# Patient Record
Sex: MALE | Race: WHITE | Employment: OTHER | URBAN - METROPOLITAN AREA
[De-identification: names, ages, dates, MRNs, and addresses within clinical notes are randomized per-mention and may not be internally consistent; named-entity substitution may affect disease eponyms.]

---
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
## 2019-03-21 ENCOUNTER — APPOINTMENT (OUTPATIENT)
Dept: GENERAL RADIOLOGY | Facility: CLINIC | Age: 19
End: 2019-03-21
Attending: FAMILY MEDICINE
Payer: COMMERCIAL

## 2019-03-21 ENCOUNTER — HOSPITAL ENCOUNTER (EMERGENCY)
Facility: CLINIC | Age: 19
Discharge: HOME | End: 2019-03-21
Attending: FAMILY MEDICINE | Admitting: FAMILY MEDICINE
Payer: COMMERCIAL

## 2019-03-21 VITALS
SYSTOLIC BLOOD PRESSURE: 141 MMHG | RESPIRATION RATE: 16 BRPM | DIASTOLIC BLOOD PRESSURE: 89 MMHG | WEIGHT: 206 LBS | TEMPERATURE: 98.2 F | BODY MASS INDEX: 25.75 KG/M2 | OXYGEN SATURATION: 98 %

## 2019-03-21 DIAGNOSIS — M25.532 LEFT WRIST PAIN: ICD-10-CM

## 2019-03-21 DIAGNOSIS — S67.32XA: ICD-10-CM

## 2019-03-21 PROCEDURE — 99283 EMERGENCY DEPT VISIT LOW MDM: CPT | Mod: Z6 | Performed by: FAMILY MEDICINE

## 2019-03-21 PROCEDURE — 99283 EMERGENCY DEPT VISIT LOW MDM: CPT | Performed by: FAMILY MEDICINE

## 2019-03-21 PROCEDURE — 73110 X-RAY EXAM OF WRIST: CPT | Mod: TC,LT

## 2019-03-21 ASSESSMENT — ENCOUNTER SYMPTOMS
JOINT SWELLING: 0
COLOR CHANGE: 0
WOUND: 0
ARTHRALGIAS: 1

## 2019-03-21 NOTE — ED AVS SNAPSHOT
[facility] Emergency Department  [address] [place] [address]  Phone:  [phone]  Fax:  [phone]                                    Austin A Stromberg   MRN: [id number]    Department:  [facility] Emergency Department   Date of Visit:  3/21/2019           After Visit Summary Signature Page    I have received my discharge instructions, and my questions have been answered. I have discussed any challenges I see with this plan with the nurse or doctor.    ..........................................................................................................................................  Patient/Patient Representative Signature      ..........................................................................................................................................  Patient Representative Print Name and Relationship to Patient    ..................................................               ................................................  Date                                   Time    ..........................................................................................................................................  Reviewed by Signature/Title    ...................................................              ..............................................  Date                                               Time          22EPIC Rev 08/18

## 2019-03-22 NOTE — ED TRIAGE NOTES
Pt was removing a 20 pound gas tank and it dropped landing on his left hand/wrist.   FROM and pulse present.  Denies numbness or tingling.

## 2019-03-22 NOTE — DISCHARGE INSTRUCTIONS
Please read and follow the handout(s) instructions. Return, if needed, for increased or worsening symptoms and as directed by the handout(s).    You may apply ice or cold packs to the area for 10-15 minutes every 1-2 hours as needed to relieve pain and/or swelling.

## 2019-03-22 NOTE — ED PROVIDER NOTES
History     Chief Complaint   Patient presents with     Hand Injury     The history is provided by the patient.     Austin A Stromberg is a 18 year old male who presents to the emergency department for a hand injury. Patient reports he injured his left hand tonight around 1915 while working on his truck. He states he was removing a 20 pound gas tank from his truck when it fell down about 1 foot and landed on his left hand. He was able to keep working on the fuel pump for another 1/2 hour after that. He states he has pain near his left thumb, wrist and lower forearm. Patient denies any pain in the elbow or numbness or tingling. He states he did cut his left hand in 2015 with a table saw. He has not put ice on his injury yet. He refused offer of pain medication at this time.    Allergies:  Allergies   Allergen Reactions     Penicillins      Ancef [Cefazolin] Rash       Problem List:    There are no active problems to display for this patient.       Past Medical History:    History reviewed. No pertinent past medical history.    Past Surgical History:    Past Surgical History:   Procedure Laterality Date     REPAIR TENDON HAND Left        Family History:    Family History   Problem Relation Age of Onset     Depression Mother         anxiety     Hypertension Paternal Grandfather      Cancer - colorectal Maternal Grandmother         great grandmother and many of her silbings     Neurologic Disorder Other         cousin with seizures     Thyroid Disease Maternal Aunt         graaves       Social History:  Marital Status:  Single [1]  Social History     Tobacco Use     Smoking status: Passive Smoke Exposure - Never Smoker     Smokeless tobacco: Never Used     Tobacco comment: Mom tries to smoke outside.    Substance Use Topics     Alcohol use: Yes     Alcohol/week: 0.0 oz     Comment: socially     Drug use: No        Medications:      IBUPROFEN PO         Review of Systems   Musculoskeletal: Positive for arthralgias  (left wrist pain). Negative for joint swelling.        Left hand injury     Skin: Negative for color change, rash and wound.   All other systems reviewed and are negative.      Physical Exam   BP: 141/89  Heart Rate: 69  Temp: 98.2  F (36.8  C)  Resp: 16  Weight: 93.4 kg (206 lb)  SpO2: 98 %      Physical Exam   Constitutional: He appears well-developed and well-nourished. No distress.   Musculoskeletal:        Left wrist: He exhibits tenderness and bony tenderness. He exhibits normal range of motion, no swelling, no effusion, no crepitus, no deformity and no laceration.        Arms:       Left hand: He exhibits deformity (Mature scar deformity noted to the left hand. Some roation of the 5th finger with flexion. Patient states this has been present since the inury in 2015.).        Hands:  Nursing note and vitals reviewed.      ED Course        Procedures               Critical Care time:  none            Results for orders placed or performed during the hospital encounter of 03/21/19 (from the past 24 hour(s))   XR Wrist Left G/E 3 Views    Narrative    XR WRIST LEFT G/E 3 VIEWS 3/21/2019 8:04 PM     HISTORY: trauma, pain, crush injury      Impression    IMPRESSION: Negative exam.    SOILA WADE MD         Assessments & Plan (with Medical Decision Making)  Crush injury to the left wrist area. No fracture identified on x-ray. Ice and elevation with ROM instructions given. Ibuprofen for pain if needed.  Follow-up in his clinic if not improved in 7-10 days for possible repeat x-ray for continued pain to R/O occult fracture.     I have reviewed the nursing notes.    I have reviewed the findings, diagnosis, plan and need for follow up with the patient.            I verbally discussed the findings of the evaluation today in the ER. I have verbally discussed with Houston the suggested treatment(s) as described in the discharge instructions and handouts.     I have verbally suggested he follow-up in his clinic or return  to the ER for increased symptoms. See the follow-up recommendations documented  in the after visit summary in this visit's EPIC chart.    Final diagnoses:   Left wrist pain   Crush injury, wrist, left, initial encounter     This document serves as a record of services personally performed by Sanket Saavedra DO. It was created on their behalf by Yajaiar Chow, a trained medical scribe. The creation of this record is based on the provider's personal observations and the statements of the patient. This document has been checked and approved by the attending provider.    Note: Chart documentation done in part with Dragon Voice Recognition software. Although reviewed after completion, some word and grammatical errors may remain.    3/21/2019   [facility] EMERGENCY DEPARTMENT     Jonathan Saavedra DO  03/21/19 2034

## 2020-09-07 ENCOUNTER — HOSPITAL ENCOUNTER (EMERGENCY)
Facility: CLINIC | Age: 20
Discharge: HOME | End: 2020-09-07
Attending: EMERGENCY MEDICINE | Admitting: EMERGENCY MEDICINE
Payer: COMMERCIAL

## 2020-09-07 VITALS
HEART RATE: 62 BPM | TEMPERATURE: 98.7 F | SYSTOLIC BLOOD PRESSURE: 158 MMHG | DIASTOLIC BLOOD PRESSURE: 95 MMHG | OXYGEN SATURATION: 100 % | RESPIRATION RATE: 16 BRPM

## 2020-09-07 DIAGNOSIS — S61.412A LACERATION OF LEFT HAND WITHOUT FOREIGN BODY, INITIAL ENCOUNTER: ICD-10-CM

## 2020-09-07 PROCEDURE — 12002 RPR S/N/AX/GEN/TRNK2.6-7.5CM: CPT | Performed by: EMERGENCY MEDICINE

## 2020-09-07 PROCEDURE — 99283 EMERGENCY DEPT VISIT LOW MDM: CPT | Performed by: EMERGENCY MEDICINE

## 2020-09-07 PROCEDURE — 99282 EMERGENCY DEPT VISIT SF MDM: CPT | Mod: 25 | Performed by: EMERGENCY MEDICINE

## 2020-09-07 PROCEDURE — 12002 RPR S/N/AX/GEN/TRNK2.6-7.5CM: CPT | Mod: Z6 | Performed by: EMERGENCY MEDICINE

## 2020-09-07 NOTE — ED AVS SNAPSHOT
[facility] Emergency Department  [address]   [place] [address]  Phone:  [phone]  Fax:  [phone]                                    Austin A Stromberg   MRN: [id number]    Department:  [facility] Emergency Department   Date of Visit:  9/7/2020           After Visit Summary Signature Page    I have received my discharge instructions, and my questions have been answered. I have discussed any challenges I see with this plan with the nurse or doctor.    ..........................................................................................................................................  Patient/Patient Representative Signature      ..........................................................................................................................................  Patient Representative Print Name and Relationship to Patient    ..................................................               ................................................  Date                                   Time    ..........................................................................................................................................  Reviewed by Signature/Title    ...................................................              ..............................................  Date                                               Time          22EPIC Rev 08/18

## 2020-09-07 NOTE — ED TRIAGE NOTES
Pt presents with concerns of a laceration.   Pt has a laceration on the left palm of the hand.  Pt cut it on a light mount- steel.  Tetanus vaccine was 2014.  No active bleeding.   Patient's airway, breathing, circulation, and disability/mental status (ABCDs) intact/WDL during triage.

## 2020-09-08 NOTE — DISCHARGE INSTRUCTIONS
Keep wound clean and dry.  Okay to shower.  No soaking.  If you are going to be in a dirty environment, I recommend that you cover with dressing and/or gloves.    Sutures out in 7 to 10 days.    Watch for any signs of infection and follow-up in clinic or return for concerns.      I hope you heal quickly!!
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
## 2021-02-24 ENCOUNTER — HOSPITAL ENCOUNTER (EMERGENCY)
Facility: CLINIC | Age: 21
Discharge: HOME | End: 2021-02-24
Attending: PHYSICIAN ASSISTANT | Admitting: PHYSICIAN ASSISTANT
Payer: COMMERCIAL

## 2021-02-24 ENCOUNTER — APPOINTMENT (OUTPATIENT)
Dept: GENERAL RADIOLOGY | Facility: CLINIC | Age: 21
End: 2021-02-24
Attending: PHYSICIAN ASSISTANT
Payer: COMMERCIAL

## 2021-02-24 VITALS
SYSTOLIC BLOOD PRESSURE: 145 MMHG | DIASTOLIC BLOOD PRESSURE: 97 MMHG | OXYGEN SATURATION: 97 % | TEMPERATURE: 99.1 F | RESPIRATION RATE: 17 BRPM | HEART RATE: 78 BPM

## 2021-02-24 DIAGNOSIS — T15.92XA: ICD-10-CM

## 2021-02-24 PROCEDURE — 65205 REMOVE FOREIGN BODY FROM EYE: CPT | Mod: LT | Performed by: PHYSICIAN ASSISTANT

## 2021-02-24 PROCEDURE — 99283 EMERGENCY DEPT VISIT LOW MDM: CPT | Mod: 25 | Performed by: PHYSICIAN ASSISTANT

## 2021-02-24 PROCEDURE — 70200 X-RAY EXAM OF EYE SOCKETS: CPT

## 2021-02-24 RX ORDER — TETRACAINE HYDROCHLORIDE 5 MG/ML
1-2 SOLUTION OPHTHALMIC ONCE
Status: DISCONTINUED | OUTPATIENT
Start: 2021-02-24 | End: 2021-02-25 | Stop reason: HOSPADM

## 2021-02-24 ASSESSMENT — VISUAL ACUITY: OU: 1

## 2021-02-25 NOTE — ED TRIAGE NOTES
Pt reports going some metal work without glasses on a couple hours ago and a piece of metal flew at his left eye. Pt reports he flushed it out and had his mom look at it but wasn't able to find anything or make it better.

## 2021-02-25 NOTE — ED PROVIDER NOTES
History     Chief Complaint   Patient presents with     Eye Pain Left Eye     HPI  Austin A Stromberg is a 20 year old male who presents for evaluation of a possible foreign body in his left eye.  States that he was using a lathe grinding metal.  He saw the metal shaving fly up and into his left eye.  He had immediate onset of a foreign body sensation.  He denies any real pain at this point.  He denies any visual acuity abnormalities.  Does not have any abnormal sensation in his right eye.  Denies wearing contact lenses or glasses.  Was not wearing protective eyewear when he was performing this task.  The accident occurred 30 minutes prior to arrival to the ED.    Allergies:  Allergies   Allergen Reactions     Penicillins      Ancef [Cefazolin] Rash       Problem List:    There are no active problems to display for this patient.       Past Medical History:    History reviewed. No pertinent past medical history.    Past Surgical History:    Past Surgical History:   Procedure Laterality Date     REPAIR TENDON HAND Left        Family History:    Family History   Problem Relation Age of Onset     Depression Mother         anxiety     Hypertension Paternal Grandfather      Cancer - colorectal Maternal Grandmother         great grandmother and many of her silbings     Neurologic Disorder Other         cousin with seizures     Thyroid Disease Maternal Aunt         flaco       Social History:  Marital Status:  Single [1]  Social History     Tobacco Use     Smoking status: Passive Smoke Exposure - Never Smoker     Smokeless tobacco: Never Used     Tobacco comment: Mom tries to smoke outside.    Substance Use Topics     Alcohol use: Yes     Alcohol/week: 0.0 standard drinks     Comment: socially     Drug use: No        Medications:    IBUPROFEN PO          Review of Systems   All other systems reviewed and are negative.      Physical Exam   BP: (!) 155/93  Pulse: 89  Temp: 99.1  F (37.3  C)  Resp: 20  SpO2: 98  %      Physical Exam  Vitals signs and nursing note reviewed.   Constitutional:       General: He is not in acute distress.     Appearance: He is not ill-appearing, toxic-appearing or diaphoretic.   HENT:      Head: Normocephalic and atraumatic.      Left Ear: External ear normal.      Nose: Nose normal.      Mouth/Throat:      Mouth: Mucous membranes are moist.   Eyes:      General: Lids are normal. Vision grossly intact. Gaze aligned appropriately. No visual field deficit or scleral icterus.        Right eye: No foreign body, discharge or hordeolum.         Left eye: Foreign body (Small metallic foreign body in the lower eyelid that came out when we had him open and close his eyes multiple times.  It was removed with a water-soaked cotton-tipped applicator without complication.  No further foreign bodies identified.) present.No discharge or hordeolum.      Extraocular Movements: Extraocular movements intact.      Conjunctiva/sclera: Conjunctivae normal.      Pupils: Pupils are equal, round, and reactive to light.      Left eye: No corneal abrasion or fluorescein uptake.   Neurological:      Mental Status: He is alert.         ED Course        Procedures               Critical Care time:  none               Results for orders placed or performed during the hospital encounter of 02/24/21 (from the past 24 hour(s))   XR Orbits G/E 3 Views    Narrative    XR ORBITS G/E 4 VW   2/24/2021 9:51 PM     HISTORY: Possible bilateral eye metallic FB    COMPARISON: None.      Impression    IMPRESSION: There is no evidence for any radiopaque foreign bodies  over either globe.    SHANTE MEJIA MD       Medications   tetracaine (PONTOCAINE) 0.5 % ophthalmic solution 1-2 drop (1 drop Left Eye Handoff 2/24/21 2046)       Assessments & Plan (with Medical Decision Making)  Acute foreign body of left eye     20 year old male presents for evaluation of a possible metallic foreign body in his left eye that occurred just prior to arrival.   Denies any visual abnormalities or pain.  On exam he does have a small metallic foreign body that came down onto the lower eyelid with multiple opening closing of the eye trying to find a foreign body.  This was removed with a cotton tipped applicator as noted above.  We did perform an x-ray of the orbits to ensure that there was no further retained foreign body.  This was negative.  Fluorescein dye applied at the end of the exam and did not show any corneal uptake and no sign of corneal abrasion.  Gross visual acuity exam completely normal.  There is no evidence for further injury.  Indications for follow-up reviewed with the patient.  He states that his eye symptoms did actually resolve by the end of his ED visit.  Strongly encouraged him to use eye protection in the future.     I have reviewed the nursing notes.    I have reviewed the findings, diagnosis, plan and need for follow up with the patient.       Discharge Medication List as of 2/24/2021 10:13 PM          Final diagnoses:   Acute foreign body of left eye       Disclaimer: This note consists of symbols derived from keyboarding, dictation and/or voice recognition software. As a result, there may be errors in the script that have gone undetected. Please consider this when interpreting information found in this chart.      2/24/2021   [facility] EMERGENCY DEPT     Rik Crum PA-C  02/24/21 [id number]

## 2021-02-25 NOTE — ED NOTES
Pt continues with irritation of the left eye, frequent blinking and shooting pains from time to time

## 2022-01-04 ENCOUNTER — HOSPITAL ENCOUNTER (EMERGENCY)
Facility: CLINIC | Age: 22
Discharge: HOME | End: 2022-01-04
Attending: FAMILY MEDICINE | Admitting: FAMILY MEDICINE
Payer: COMMERCIAL

## 2022-01-04 VITALS
RESPIRATION RATE: 20 BRPM | SYSTOLIC BLOOD PRESSURE: 145 MMHG | TEMPERATURE: 97 F | DIASTOLIC BLOOD PRESSURE: 88 MMHG | OXYGEN SATURATION: 95 % | HEART RATE: 78 BPM | WEIGHT: 215 LBS | BODY MASS INDEX: 26.87 KG/M2

## 2022-01-04 DIAGNOSIS — Z72.0 VAPES NICOTINE CONTAINING SUBSTANCE: ICD-10-CM

## 2022-01-04 DIAGNOSIS — U07.1 COVID-19: ICD-10-CM

## 2022-01-04 LAB
FLUAV RNA SPEC QL NAA+PROBE: NEGATIVE
FLUBV RNA RESP QL NAA+PROBE: NEGATIVE
SARS-COV-2 RNA RESP QL NAA+PROBE: POSITIVE

## 2022-01-04 PROCEDURE — C9803 HOPD COVID-19 SPEC COLLECT: HCPCS | Performed by: FAMILY MEDICINE

## 2022-01-04 PROCEDURE — 87636 SARSCOV2 & INF A&B AMP PRB: CPT | Performed by: FAMILY MEDICINE

## 2022-01-04 PROCEDURE — 99284 EMERGENCY DEPT VISIT MOD MDM: CPT | Performed by: FAMILY MEDICINE

## 2022-01-04 RX ORDER — ACETAMINOPHEN 500 MG
500-1000 TABLET ORAL EVERY 6 HOURS PRN
Refills: 0 | COMMUNITY
Start: 2022-01-04 | End: 2022-01-08

## 2022-01-04 RX ORDER — MELOXICAM 15 MG/1
15 TABLET ORAL DAILY
Qty: 10 TABLET | Refills: 0 | Status: SHIPPED | OUTPATIENT
Start: 2022-01-04 | End: 2022-01-14

## 2022-01-04 RX ORDER — ONDANSETRON 4 MG/1
4 TABLET, ORALLY DISINTEGRATING ORAL EVERY 8 HOURS PRN
Qty: 10 TABLET | Refills: 0 | Status: SHIPPED | OUTPATIENT
Start: 2022-01-04 | End: 2022-01-07

## 2022-01-05 ENCOUNTER — PATIENT OUTREACH (OUTPATIENT)
Dept: CARE COORDINATION | Facility: CLINIC | Age: 22
End: 2022-01-05
Payer: COMMERCIAL

## 2022-01-05 NOTE — PROGRESS NOTES
Clinic Care Coordination Contact    Care Coordination ED Discharge Follow up Note    Patient referred for Virtual Home Monitoring Program for COVID-19 following recent ED visit.    RN has confirmed a [facility] referral is in place.    Criteria for Virtual Home Monitoring telephone outreach is not met after review of ED encounter/ED provider note because:    1) Patient did not report shortness of breath or respiratory distress as a symptom/concern.    2) ED provider note indicates assessment was negative for respiratory distress, and O2 sats as well as vital signs were stable.      3) Patient was not discharged with new supplemental oxygen.    Per notes, ED provider and/or ED team discussed appropriate follow up guidelines with patient prior to discharge, or reflected these instructions on AVS.       [facility] team remains available to support patient via [facility] account once activated by patient.     Darcy Alonso RN  [facility]  - Clinic Care Coordinator

## 2022-01-05 NOTE — ED PROVIDER NOTES
History     Chief Complaint   Patient presents with     Cough     HPI  Austin A Stromberg is a 21 year old male who presents to the ER with concerns about cough, chills, fever, muscle aches and headache symptoms have been present for 1 week.  Patient is concerned because he has had some increasing shortness of breath symptoms associated with any activity that he does not run the home.  Is also had some diarrhea issues but states that has actually gotten some better recently.  States that he is typically otherwise healthy.  When asked if he is a smoker or vapor he states that he does not vape but is cut down on how often he is vaping to just twice a day in the last few days because of his illness.      Allergies:  Allergies   Allergen Reactions     Penicillins      Ancef [Cefazolin] Rash       Problem List:    There are no problems to display for this patient.       Past Medical History:    No past medical history on file.    Past Surgical History:    Past Surgical History:   Procedure Laterality Date     REPAIR TENDON HAND Left        Family History:    Family History   Problem Relation Age of Onset     Depression Mother         anxiety     Hypertension Paternal Grandfather      Cancer - colorectal Maternal Grandmother         great grandmother and many of her silbings     Neurologic Disorder Other         cousin with seizures     Thyroid Disease Maternal Aunt         flaco       Social History:  Marital Status:  Single [1]  Social History     Tobacco Use     Smoking status: Passive Smoke Exposure - Never Smoker     Smokeless tobacco: Never Used     Tobacco comment: Mom tries to smoke outside.    Substance Use Topics     Alcohol use: Yes     Alcohol/week: 0.0 standard drinks     Comment: socially     Drug use: No        Medications:    acetaminophen (TYLENOL) 500 MG tablet  meloxicam (MOBIC) 15 MG tablet  ondansetron (ZOFRAN ODT) 4 MG ODT tab          Review of Systems   All other systems reviewed and are  negative.      Physical Exam   BP: (!) 154/100  Pulse: 100  Temp: 97  F (36.1  C)  Resp: 18  Weight: 97.5 kg (215 lb)  SpO2: 96 %      Physical Exam  Vitals and nursing note reviewed.   Constitutional:       General: He is in acute distress.      Appearance: He is normal weight. He is ill-appearing.   HENT:      Head: Normocephalic and atraumatic.      Nose: Congestion present.      Mouth/Throat:      Mouth: Mucous membranes are moist.   Eyes:      General: No scleral icterus.     Extraocular Movements: Extraocular movements intact.      Conjunctiva/sclera: Conjunctivae normal.      Pupils: Pupils are equal, round, and reactive to light.   Cardiovascular:      Rate and Rhythm: Normal rate.      Pulses: Normal pulses.   Pulmonary:      Effort: Pulmonary effort is normal. No respiratory distress.      Breath sounds: Normal breath sounds.   Abdominal:      Tenderness: There is no guarding.   Musculoskeletal:         General: No tenderness.      Cervical back: Normal range of motion and neck supple.      Right lower leg: No edema.      Left lower leg: No edema.   Skin:     Capillary Refill: Capillary refill takes less than 2 seconds.      Coloration: Skin is not pale.      Findings: No rash.   Neurological:      Mental Status: He is alert and oriented to person, place, and time.   Psychiatric:         Mood and Affect: Mood normal.         Behavior: Behavior normal.         ED Course                 Procedures              Critical Care time:  none               Results for orders placed or performed during the hospital encounter of 01/04/22 (from the past 24 hour(s))   Symptomatic; Unknown Influenza A/B & SARS-CoV2 (COVID-19) Virus PCR Multiplex Nose    Specimen: Nose; Swab   Result Value Ref Range    Influenza A PCR Negative Negative    Influenza B PCR Negative Negative    SARS CoV2 PCR Positive (A) Negative    Narrative    Testing was performed using the mariano SARS-CoV-2 & Influenza A/B Assay on the mariano [employer] System.  This test should be ordered for the detection of SARS-CoV-2 and influenza viruses in individuals who meet clinical and/or epidemiological criteria. Test performance is unknown in asymptomatic patients. This test is for in vitro diagnostic use under the FDA EUA for laboratories certified under CLIA to perform moderate and/or high complexity testing. This test has not been FDA cleared or approved. A negative result does not rule out the presence of PCR inhibitors in the specimen or target RNA in concentration below the limit of detection for the assay. If only one viral target is positive but coinfection with multiple targets is suspected, the sample should be re-tested with another FDA cleared, approved or authorized test, if coinfection would change clinical management. [facility] are certified under the Clinical Laboratory Improvement Amendments of 1988 (CLIA-88) as  qualified to perform moderate and/or high complexity laboratory testing.       Medications - No data to display    Assessments & Plan (with Medical Decision Making)  21-year-old to the ER secondary concerns of not feeling well for the last 8 days.  Patient with cough, shortness of breath, body aches, fever, chills, and diarrhea symptoms.  He states that the diarrhea has improved some.  Patient also vapes and continues to vape 1 or 2 times a day since being sick.  He is encouraged to stop that activity to improve his oxygenation to his lungs.  I spent time talking to the patient about COVID-19 infection.  Patient was given a pulse oximetry monitor and instructed appropriate use.  He was given instructions both verbal and written on signs and symptoms of concern and when to return the ER if he has increase or worsening symptoms.  Patient will be set up for the Covid get well loop follow-up.  Medications to help with myalgias and nausea were sent to his pharmacy.  Patient discharged in stable condition.     I have reviewed the nursing  notes.    I have reviewed the findings, diagnosis, plan and need for follow up with the patient.       New Prescriptions    ACETAMINOPHEN (TYLENOL) 500 MG TABLET    Take 1-2 tablets (500-1,000 mg) by mouth every 6 hours as needed for fever or pain    MELOXICAM (MOBIC) 15 MG TABLET    Take 1 tablet (15 mg) by mouth daily for 10 days TAKE WITH FOOD AS NEEDED FOR PAIN. WEAN OFF OF THE MEDICATIONS AS YOUR SYMPTOMS IMPROVE.    ONDANSETRON (ZOFRAN ODT) 4 MG ODT TAB    Take 1 tablet (4 mg) by mouth every 8 hours as needed for nausea or vomiting            I verbally discussed the findings of the evaluation today in the ER. I have verbally discussed with [place] the suggested treatment(s) as described in the discharge instructions and handouts. I have prescribed the above listed medications and instructed him on appropriate use of these medications.      I have verbally suggested he follow-up in his clinic or return to the ER for increased symptoms. See the follow-up recommendations documented  in the after visit summary in this visit's EPIC chart.    Final diagnoses:   COVID-19   Vapes nicotine containing substance       1/4/2022   [facility] EMERGENCY DEPT     Jonathan Saavedra,   01/04/22 [id number]

## 2022-01-05 NOTE — DISCHARGE INSTRUCTIONS
Please read and follow the handout(s) instructions. Return, if needed, for increased or worsening symptoms and as directed by the handout(s).    I sent your new script(s) to the  pharmacy in [place].